# Patient Record
(demographics unavailable — no encounter records)

---

## 2024-10-22 NOTE — HISTORY OF PRESENT ILLNESS
[___ Months Post Op] : [unfilled] months post op [Healed] : healed [Neuro Intact] : an unremarkable neurological exam [Xray (Date:___)] : [unfilled] Xray -  [Hardware in Good Position] : hardware in good position [Slow Progress] : is progressing slowly [No Sign of Infection] : is showing no signs of infection [Adequate Pain Control] : has adequate pain control [No ADLs] : to avoid most activities of daily living [No Work] : not to work [No Housework] : not to do housework [de-identified] : s/p C5-C6 ACDF, DOS: 7/25/24 [de-identified] : 50 year old woman presents for post-op visit, s/p C5-C6 ACDF on 7/25/24.  Patient reports overall improvement in pre-op symptoms.PT not helping with residual symptoms.  Patient denies fever, chills, weight changes, loss of bladder control, bowel incontinence or urinary retention or saddle anesthesia. [de-identified] : PT, renewal of medications, RTO 3 months.   Diagnosis, prognosis, natural history and treatment was discussed with patient. Patient was advised if the following symptoms develop: chills, fever,  loss of bladder control, bowel incontinence or urinary retention, numbness/tingling or weakness is present in upper or lower extremities, to go to the nearest emergency room. This may be a new clinical condition not present at the time of the patient visit  that may lead to paralysis and/or death, Patient advised if the above symptoms developed to also call the office immediately to inform us and to go to the nearest emergency room.

## 2024-10-22 NOTE — BEGINNING OF VISIT
[Patient] : patient [] :  [Patient Declined  Services] : - None: Patient declined  services [Interpreters_FullName] : Cory Ng [Interpreters_Relationshiptopatient] :